# Patient Record
Sex: FEMALE | Employment: FULL TIME | URBAN - METROPOLITAN AREA
[De-identification: names, ages, dates, MRNs, and addresses within clinical notes are randomized per-mention and may not be internally consistent; named-entity substitution may affect disease eponyms.]

---

## 2020-01-08 LAB
HBV SURFACE AG SERPL QL IA: NEGATIVE
HIV 1+2 AB+HIV1 P24 AG SERPL QL IA: NEGATIVE

## 2020-07-29 DIAGNOSIS — Z33.1 PREGNANT STATE, INCIDENTAL: Primary | ICD-10-CM

## 2020-08-05 DIAGNOSIS — Z33.1 PREGNANT STATE, INCIDENTAL: ICD-10-CM

## 2020-08-05 PROCEDURE — U0003 INFECTIOUS AGENT DETECTION BY NUCLEIC ACID (DNA OR RNA); SEVERE ACUTE RESPIRATORY SYNDROME CORONAVIRUS 2 (SARS-COV-2) (CORONAVIRUS DISEASE [COVID-19]), AMPLIFIED PROBE TECHNIQUE, MAKING USE OF HIGH THROUGHPUT TECHNOLOGIES AS DESCRIBED BY CMS-2020-01-R: HCPCS | Performed by: OBSTETRICS & GYNECOLOGY

## 2020-08-06 LAB
SARS-COV-2 RNA SPEC QL NAA+PROBE: NOT DETECTED
SPECIMEN SOURCE: NORMAL

## 2020-08-07 ENCOUNTER — NURSE TRIAGE (OUTPATIENT)
Dept: NURSING | Facility: CLINIC | Age: 45
End: 2020-08-07

## 2020-08-07 ENCOUNTER — HOSPITAL ENCOUNTER (INPATIENT)
Facility: CLINIC | Age: 45
LOS: 2 days | Discharge: HOME | End: 2020-08-09
Attending: OBSTETRICS & GYNECOLOGY | Admitting: OBSTETRICS & GYNECOLOGY
Payer: COMMERCIAL

## 2020-08-07 LAB
ABO + RH BLD: NORMAL
ABO + RH BLD: NORMAL
BLD GP AB SCN SERPL QL: NORMAL
BLOOD BANK CMNT PATIENT-IMP: NORMAL
GLUCOSE BLDC GLUCOMTR-MCNC: 137 MG/DL (ref 70–99)
GLUCOSE BLDC GLUCOMTR-MCNC: 138 MG/DL (ref 70–99)
GLUCOSE BLDC GLUCOMTR-MCNC: 139 MG/DL (ref 70–99)
GLUCOSE BLDC GLUCOMTR-MCNC: 143 MG/DL (ref 70–99)
GLUCOSE BLDC GLUCOMTR-MCNC: 88 MG/DL (ref 70–99)
GLUCOSE BLDC GLUCOMTR-MCNC: 96 MG/DL (ref 70–99)
HGB BLD-MCNC: 12.5 G/DL (ref 11.7–15.7)
SPECIMEN EXP DATE BLD: NORMAL
T PALLIDUM AB SER QL: NONREACTIVE

## 2020-08-07 PROCEDURE — 85018 HEMOGLOBIN: CPT | Performed by: OBSTETRICS & GYNECOLOGY

## 2020-08-07 PROCEDURE — 86900 BLOOD TYPING SEROLOGIC ABO: CPT | Performed by: OBSTETRICS & GYNECOLOGY

## 2020-08-07 PROCEDURE — 86780 TREPONEMA PALLIDUM: CPT | Performed by: OBSTETRICS & GYNECOLOGY

## 2020-08-07 PROCEDURE — 10907ZC DRAINAGE OF AMNIOTIC FLUID, THERAPEUTIC FROM PRODUCTS OF CONCEPTION, VIA NATURAL OR ARTIFICIAL OPENING: ICD-10-PCS | Performed by: OBSTETRICS & GYNECOLOGY

## 2020-08-07 PROCEDURE — 86850 RBC ANTIBODY SCREEN: CPT | Performed by: OBSTETRICS & GYNECOLOGY

## 2020-08-07 PROCEDURE — 00000146 ZZHCL STATISTIC GLUCOSE BY METER IP

## 2020-08-07 PROCEDURE — 25000125 ZZHC RX 250: Performed by: OBSTETRICS & GYNECOLOGY

## 2020-08-07 PROCEDURE — 72200001 ZZH LABOR CARE VAGINAL DELIVERY SINGLE

## 2020-08-07 PROCEDURE — 0KQM0ZZ REPAIR PERINEUM MUSCLE, OPEN APPROACH: ICD-10-PCS | Performed by: OBSTETRICS & GYNECOLOGY

## 2020-08-07 PROCEDURE — 25000132 ZZH RX MED GY IP 250 OP 250 PS 637: Performed by: OBSTETRICS & GYNECOLOGY

## 2020-08-07 PROCEDURE — G0463 HOSPITAL OUTPT CLINIC VISIT: HCPCS

## 2020-08-07 PROCEDURE — 25800030 ZZH RX IP 258 OP 636: Performed by: OBSTETRICS & GYNECOLOGY

## 2020-08-07 PROCEDURE — 86901 BLOOD TYPING SEROLOGIC RH(D): CPT | Performed by: OBSTETRICS & GYNECOLOGY

## 2020-08-07 PROCEDURE — 12000000 ZZH R&B MED SURG/OB

## 2020-08-07 RX ORDER — ACETAMINOPHEN 325 MG/1
650 TABLET ORAL EVERY 4 HOURS PRN
Status: DISCONTINUED | OUTPATIENT
Start: 2020-08-07 | End: 2020-08-09 | Stop reason: HOSPADM

## 2020-08-07 RX ORDER — OXYTOCIN 10 [USP'U]/ML
10 INJECTION, SOLUTION INTRAMUSCULAR; INTRAVENOUS
Status: DISCONTINUED | OUTPATIENT
Start: 2020-08-07 | End: 2020-08-07

## 2020-08-07 RX ORDER — IBUPROFEN 800 MG/1
800 TABLET, FILM COATED ORAL
Status: DISCONTINUED | OUTPATIENT
Start: 2020-08-07 | End: 2020-08-07

## 2020-08-07 RX ORDER — ONDANSETRON 2 MG/ML
4 INJECTION INTRAMUSCULAR; INTRAVENOUS EVERY 6 HOURS PRN
Status: DISCONTINUED | OUTPATIENT
Start: 2020-08-07 | End: 2020-08-07

## 2020-08-07 RX ORDER — AMOXICILLIN 250 MG
1 CAPSULE ORAL 2 TIMES DAILY
Status: DISCONTINUED | OUTPATIENT
Start: 2020-08-07 | End: 2020-08-09 | Stop reason: HOSPADM

## 2020-08-07 RX ORDER — DEXTROSE MONOHYDRATE 25 G/50ML
25-50 INJECTION, SOLUTION INTRAVENOUS
Status: DISCONTINUED | OUTPATIENT
Start: 2020-08-07 | End: 2020-08-07

## 2020-08-07 RX ORDER — HYDROCORTISONE 2.5 %
CREAM (GRAM) TOPICAL 3 TIMES DAILY PRN
Status: DISCONTINUED | OUTPATIENT
Start: 2020-08-07 | End: 2020-08-09 | Stop reason: HOSPADM

## 2020-08-07 RX ORDER — OXYCODONE AND ACETAMINOPHEN 5; 325 MG/1; MG/1
1 TABLET ORAL
Status: DISCONTINUED | OUTPATIENT
Start: 2020-08-07 | End: 2020-08-07

## 2020-08-07 RX ORDER — MODIFIED LANOLIN
OINTMENT (GRAM) TOPICAL
Status: DISCONTINUED | OUTPATIENT
Start: 2020-08-07 | End: 2020-08-09 | Stop reason: HOSPADM

## 2020-08-07 RX ORDER — FENTANYL CITRATE 50 UG/ML
50-100 INJECTION, SOLUTION INTRAMUSCULAR; INTRAVENOUS
Status: DISCONTINUED | OUTPATIENT
Start: 2020-08-07 | End: 2020-08-07

## 2020-08-07 RX ORDER — METHYLERGONOVINE MALEATE 0.2 MG/ML
200 INJECTION INTRAVENOUS
Status: DISCONTINUED | OUTPATIENT
Start: 2020-08-07 | End: 2020-08-07

## 2020-08-07 RX ORDER — OXYTOCIN/0.9 % SODIUM CHLORIDE 30/500 ML
340 PLASTIC BAG, INJECTION (ML) INTRAVENOUS CONTINUOUS PRN
Status: DISCONTINUED | OUTPATIENT
Start: 2020-08-07 | End: 2020-08-09 | Stop reason: HOSPADM

## 2020-08-07 RX ORDER — CARBOPROST TROMETHAMINE 250 UG/ML
250 INJECTION, SOLUTION INTRAMUSCULAR
Status: DISCONTINUED | OUTPATIENT
Start: 2020-08-07 | End: 2020-08-07

## 2020-08-07 RX ORDER — OXYTOCIN 10 [USP'U]/ML
10 INJECTION, SOLUTION INTRAMUSCULAR; INTRAVENOUS
Status: DISCONTINUED | OUTPATIENT
Start: 2020-08-07 | End: 2020-08-09 | Stop reason: HOSPADM

## 2020-08-07 RX ORDER — NICOTINE POLACRILEX 4 MG
15-30 LOZENGE BUCCAL
Status: DISCONTINUED | OUTPATIENT
Start: 2020-08-07 | End: 2020-08-07

## 2020-08-07 RX ORDER — NALOXONE HYDROCHLORIDE 0.4 MG/ML
.1-.4 INJECTION, SOLUTION INTRAMUSCULAR; INTRAVENOUS; SUBCUTANEOUS
Status: DISCONTINUED | OUTPATIENT
Start: 2020-08-07 | End: 2020-08-07

## 2020-08-07 RX ORDER — IBUPROFEN 800 MG/1
800 TABLET, FILM COATED ORAL EVERY 6 HOURS PRN
Status: DISCONTINUED | OUTPATIENT
Start: 2020-08-07 | End: 2020-08-09 | Stop reason: HOSPADM

## 2020-08-07 RX ORDER — BISACODYL 10 MG
10 SUPPOSITORY, RECTAL RECTAL DAILY PRN
Status: DISCONTINUED | OUTPATIENT
Start: 2020-08-09 | End: 2020-08-09 | Stop reason: HOSPADM

## 2020-08-07 RX ORDER — MISOPROSTOL 200 UG/1
400 TABLET ORAL
Status: DISCONTINUED | OUTPATIENT
Start: 2020-08-07 | End: 2020-08-09 | Stop reason: HOSPADM

## 2020-08-07 RX ORDER — CARBOPROST TROMETHAMINE 250 UG/ML
250 INJECTION, SOLUTION INTRAMUSCULAR
Status: DISCONTINUED | OUTPATIENT
Start: 2020-08-07 | End: 2020-08-09 | Stop reason: HOSPADM

## 2020-08-07 RX ORDER — TRANEXAMIC ACID 10 MG/ML
1 INJECTION, SOLUTION INTRAVENOUS EVERY 30 MIN PRN
Status: DISCONTINUED | OUTPATIENT
Start: 2020-08-07 | End: 2020-08-07

## 2020-08-07 RX ORDER — METHYLERGONOVINE MALEATE 0.2 MG/ML
200 INJECTION INTRAVENOUS
Status: DISCONTINUED | OUTPATIENT
Start: 2020-08-07 | End: 2020-08-09 | Stop reason: HOSPADM

## 2020-08-07 RX ORDER — DEXTROSE MONOHYDRATE 25 G/50ML
25-50 INJECTION, SOLUTION INTRAVENOUS
Status: DISCONTINUED | OUTPATIENT
Start: 2020-08-07 | End: 2020-08-09 | Stop reason: HOSPADM

## 2020-08-07 RX ORDER — TRANEXAMIC ACID 10 MG/ML
1 INJECTION, SOLUTION INTRAVENOUS EVERY 30 MIN PRN
Status: DISCONTINUED | OUTPATIENT
Start: 2020-08-07 | End: 2020-08-09 | Stop reason: HOSPADM

## 2020-08-07 RX ORDER — OXYTOCIN/0.9 % SODIUM CHLORIDE 30/500 ML
100 PLASTIC BAG, INJECTION (ML) INTRAVENOUS CONTINUOUS
Status: DISCONTINUED | OUTPATIENT
Start: 2020-08-07 | End: 2020-08-09 | Stop reason: HOSPADM

## 2020-08-07 RX ORDER — AMOXICILLIN 250 MG
2 CAPSULE ORAL 2 TIMES DAILY
Status: DISCONTINUED | OUTPATIENT
Start: 2020-08-07 | End: 2020-08-09 | Stop reason: HOSPADM

## 2020-08-07 RX ORDER — ACETAMINOPHEN 325 MG/1
650 TABLET ORAL EVERY 4 HOURS PRN
Status: DISCONTINUED | OUTPATIENT
Start: 2020-08-07 | End: 2020-08-07

## 2020-08-07 RX ORDER — SODIUM CHLORIDE 9 MG/ML
INJECTION, SOLUTION INTRAVENOUS CONTINUOUS
Status: DISCONTINUED | OUTPATIENT
Start: 2020-08-07 | End: 2020-08-07

## 2020-08-07 RX ORDER — NALOXONE HYDROCHLORIDE 0.4 MG/ML
.1-.4 INJECTION, SOLUTION INTRAMUSCULAR; INTRAVENOUS; SUBCUTANEOUS
Status: DISCONTINUED | OUTPATIENT
Start: 2020-08-07 | End: 2020-08-09 | Stop reason: HOSPADM

## 2020-08-07 RX ORDER — NICOTINE POLACRILEX 4 MG
15-30 LOZENGE BUCCAL
Status: DISCONTINUED | OUTPATIENT
Start: 2020-08-07 | End: 2020-08-09 | Stop reason: HOSPADM

## 2020-08-07 RX ORDER — SODIUM CHLORIDE, SODIUM LACTATE, POTASSIUM CHLORIDE, CALCIUM CHLORIDE 600; 310; 30; 20 MG/100ML; MG/100ML; MG/100ML; MG/100ML
INJECTION, SOLUTION INTRAVENOUS CONTINUOUS
Status: DISCONTINUED | OUTPATIENT
Start: 2020-08-07 | End: 2020-08-07

## 2020-08-07 RX ORDER — OXYTOCIN/0.9 % SODIUM CHLORIDE 30/500 ML
100-340 PLASTIC BAG, INJECTION (ML) INTRAVENOUS CONTINUOUS PRN
Status: COMPLETED | OUTPATIENT
Start: 2020-08-07 | End: 2020-08-07

## 2020-08-07 RX ADMIN — SODIUM CHLORIDE: 9 INJECTION, SOLUTION INTRAVENOUS at 05:00

## 2020-08-07 RX ADMIN — DOCUSATE SODIUM AND SENNOSIDES 1 TABLET: 8.6; 5 TABLET ORAL at 20:45

## 2020-08-07 RX ADMIN — IBUPROFEN 800 MG: 800 TABLET, FILM COATED ORAL at 07:39

## 2020-08-07 RX ADMIN — IBUPROFEN 800 MG: 800 TABLET, FILM COATED ORAL at 23:11

## 2020-08-07 RX ADMIN — DOCUSATE SODIUM AND SENNOSIDES 1 TABLET: 8.6; 5 TABLET ORAL at 12:16

## 2020-08-07 RX ADMIN — LIDOCAINE HYDROCHLORIDE 20 ML: 10 INJECTION, SOLUTION EPIDURAL; INFILTRATION; INTRACAUDAL; PERINEURAL at 06:45

## 2020-08-07 RX ADMIN — Medication 340 ML/HR: at 06:45

## 2020-08-07 RX ADMIN — Medication 1.5 UNITS/HR: at 04:39

## 2020-08-07 NOTE — PROVIDER NOTIFICATION
08/07/20 0619   Provider Notification   Provider Name/Title Dr. Siddiqui   Method of Notification Phone     MD updated on SVE with an anterior lip and patient's urge to push. MD to come to bedside.

## 2020-08-07 NOTE — CONSULTS
"BRIEF NUTRITION ASSESSMENT      REASON FOR ASSESSMENT:  Aixa Luke is a 45 year old female seen by Registered Dietitian for Admission Nutrition Risk Screen for new/uncontrolled diabetes.    NUTRITION HISTORY:  - Per review of chart patient with a h/o DMII, on insulin PTA.   - Now post-partum.     CURRENT DIET AND INTAKE:  Diet: Mod CHO    ANTHROPOMETRICS:  Height: 5' 7\"  Weight: 190# --> most recent wt from 7/29/2020  BMI: 29 kg/m^2  Weight Status: Unable to determine   Weight History:  Wt Readings from Last 10 Encounters:   No data found for Wt       LABS:  Labs noted:  No results found for: A1C    MALNUTRITION:  Patient does not meet 2 malnutrition criteria.      NUTRITION INTERVENTION:  Nutrition Diagnosis:  No nutrition diagnosis at this time.    Implementation:  Nutrition Education: No education needs assessed at this time, outpatient follow-up if needed per MD discretion.     FOLLOW UP/MONITORING:   Will re-evaluate in 7 - 10 days, or sooner, if formally consulted.        Roya Acevedo RDN, LD  Clinical Dietitian  3rd floor/ICU: [phone]  All other floors: [phone]  Weekend/holiday: [phone]  "

## 2020-08-07 NOTE — TELEPHONE ENCOUNTER
"ANGEL 20    Dr. Aaron from [facility]   Planning to deliver at [facility]   4th baby, Last one was 17 years ago and lasted 3 hours.     Thinks she is in labor  Contractions started yesterday around 4pm  At 10pm became about every 10 min     For the last half hour  About 5 min apart, intense contration  45-60sec  Getting more intense    [place] discharge yesterday    No leakage of fluid  No vaginal bleeding  No new hand/face swelling     Triaged to a disposition of Go to L&D Now. Patient is agreeable and states she will be there in 25 min. Notified L&D     Reason for Disposition    [1] History of prior delivery (multipara) AND [2] contractions < 10 minutes apart AND [3] present 1 hour    Additional Information    Negative: Passed out (i.e., lost consciousness, collapsed and was not responding)    Negative: Shock suspected (e.g., cold/pale/clammy skin, too weak to stand, low BP, rapid pulse)    Negative: Difficult to awaken or acting confused (e.g., disoriented, slurred speech)    Negative: [1] SEVERE abdominal pain (e.g., excruciating) AND [2] constant AND [3] present > 1 hour    Negative: Severe bleeding (e.g., continuous red blood from vagina, or large blood clots)    Negative: Umbilical cord hanging out of the vagina (shiny, white, curled appearance, \"like telephone cord\")    Negative: Uncontrollable urge to push (i.e., feels like baby is coming out now)    Negative: Can see baby    Negative: Sounds like a life-threatening emergency to the triager    Negative: Pregnant < 37 weeks (i.e., )    Negative: [1] Uncertain delivery date AND [2] possibly pregnant < 37 weeks (i.e., )    Negative: [1] First baby (primipara) AND [2] contractions < 6 minutes apart  AND [3] present 2 hours    Protocols used: PREGNANCY - LABOR-A-    Delaney Gonzalez RN on 2020 at 7:12 AM    "

## 2020-08-07 NOTE — L&D DELIVERY NOTE
Aixa Luke is a 45 year old  who was admitted at 39w1d in active labor.   Pregnancy c/b:   - AMA; Abnormal Quad; normal level 2 US.  Declined any further screening.  - T2DM on insulin   She underwent labor augmentation with AROM upon admission, notable for clear fluid.  She used NO for labor. She was noted to be complete at around 0625.  She pushed effectively and delivered a viable male infant at 0633 with APGARs of 8 and 9 respectively.  Spontaneously delivery of an intact placenta with a 3-V cord ensued shortly thereafter, trailing membranes were present.  She received 30 units of IV pitocin as a uterotonic agent.  Exam of the perineum revealed a 2nd degree laceration which was repaired in standard fashion using a 3-0 vicryl suture.  Bilateral periurethral lacerations were hemostatic, and thus not repaired. QBL 469cc.      Lillian Huntley MD   Pager: [phone]   2020, 6:57 AM     Delivery Summary    Aixa Luke MRN# [id number]   Age: 45 year old YOB: 1975          Labor Length    3rd Stage (hrs):  0 (min):  5      Labor Events     labor?:  No   steroids:  None  Labor Type:  Spontaneous  Predominate monitoring during 1st stage:  continuous electronic fetal monitoring     Antibiotics received during labor?:  No     Rupture date/time: 20 0438   Rupture type:  Artificial Rupture of Membranes  Fluid color:  Clear     1:1 continuous labor support provided by?:  RN       Delivery/Placenta Date and Time    Delivery Date:  20 Delivery Time:   6:33 AM   Placenta Date/Time:  2020  6:39 AM  Oxytocin given at the time of delivery:  after delivery of placenta     Vaginal Counts     Initial count performed by 2 team members:   Two Team Members   PHYLLIS Dominguez Dr.       Needles Suture Erick Sponges Instruments   Initial counts 2  5    Added to count  1     Final counts 2 1 5    Placed during labor Accounted for at the end of labor   No NA   No NA    No NA    Final count performed by 2 team members:   Two Team Members   George LYNN         Apgars    Living status:  Living   1 Minute 5 Minute 10 Minute 15 Minute 20 Minute   Skin color: 0  1       Heart rate: 2  2       Reflex irritability: 2  2       Muscle tone: 2  2       Respiratory effort: 2  2       Total: 8  9       Apgars assigned by:  ADORE LYNN RN.     Cord    Cord Blood Disposition:  Lab Gases Sent?:  No       Resuscitation    Methods:  None     Skin to Skin and Feeding Plan    Skin to skin initiation date/time: 1841    Skin to skin with:  Mother  Skin to skin end date/time:        Labor Events and Shoulder Dystocia    Fetal Tracing Prior to Delivery:  Category 2  Shoulder dystocia present?:  Neg     Delivery (Maternal) (Provider to Complete) (675770)    Episiotomy:  None  Perineal lacerations:  2nd Repaired?:  Yes   Periurethral laceration:  bilateral Repaired?:  No      Blood Loss  Mother: Randall LukeAixa #[id number]   Start of Mother's Information    IO Blood Loss  20 1833 - 20 0658    Delivery QBL (mL) Hospital Encounter 469 mL    Total  469 mL         End of Mother's Information  Mother: Randall LukeAixa #[id number]         Delivery - Provider to Complete ([id number])    Delivering clinician:  Lillian Vazquez MD  Attempted Delivery Types (Choose all that apply):  Spontaneous Vaginal Delivery  Delivery Type (Choose the 1 that will go to the Birth History):  Vaginal, Spontaneous   Other personnel:   Provider Role   Bardai, Rozina R, RN Kolles, Jessica L, RN Delivery Nurse   Lillian Vazquez MD Obstetrician         Placenta    Delayed Cord Clamping:  Done  Date/Time:  2020  6:39 AM  Removal:  Expressed  Disposition:  Hospital disposal     Anesthesia    Method:  Nitrous Oxide          Presentation and Position    Presentation:  Vertex  Position:  Left Occiput Anterior           Lillian Vazquez MD

## 2020-08-07 NOTE — PROGRESS NOTES
"Consult noted.  Patient listed as having \"diabetes mellitus\" in her history, however, review of records shows HgbA1c of 5.9 at her prenatal visit.  Was on no antihyperglycemics prior to pregnancy.  Looks as though Ob Gyn has added ISS.      Will defer consult for now.  Please call if needs to be seen.      Macey PAC  "

## 2020-08-07 NOTE — PROVIDER NOTIFICATION
08/07/20 0558   Provider Notification   Provider Name/Title Dr. Siddiqui   Method of Notification Phone   Notification Reason Status Update;SVE     Updated MD about SVE of 8/90/0 and patient still tolerating nitrous oxide. MD waiting for another patient to potentially deliver, and then would like pitocin started unless patient makes progress. Verbal orders for pitocin orderset.

## 2020-08-07 NOTE — PLAN OF CARE
Data: Aixa Luke transferred to [facility] via wheelchair at 0900. Baby transferred via parent's arms.  Action: Receiving unit notified of transfer: Yes. Patient and family notified of room change. Report given to PHYLLIS Morgan at 0905. Belongings sent to receiving unit. Accompanied by Registered Nurse. Oriented patient to surroundings. Call light within reach. ID bands double-checked with receiving RN.  Response: Patient tolerated transfer and is stable.

## 2020-08-07 NOTE — PLAN OF CARE
Data: Patient presented to [facility]: 2020  3:48 AM.  Reason for maternal/fetal assessment is uterine contractions. Patient reports feeling contractions begin at 4pm yesterday, which increased in frequency and intensity overnight.  Patient is a .  Prenatal record reviewed. Pregnancy  has been complicated by gestational diabetes, insulin controlled and AMA.  Gestational Age 39w1d. VSS. Fetal movement present. Patient denies leaking of vaginal fluid/rupture of membranes, vaginal bleeding, abdominal pain, pelvic pressure, nausea, vomiting, headache, visual disturbances, epigastric or URQ pain, significant edema. Support person is present.   Action: Verbal consent for EFM. Triage assessment completed. Bill of rights reviewed.  Response: Patient verbalized agreement with plan. Will contact Dr Lillian Vazquez with update and further orders.

## 2020-08-07 NOTE — PLAN OF CARE
Late entry: MD notified via charge nurse of patient's arrival, being a multip at 8cm and non-medicated. MD is on her way. Updated upon arrival of patient's history. MD did an SVE and AROM with clear fluid. Patient using nitrous oxide and not planning an epidural.

## 2020-08-07 NOTE — H&P
[facility] Labor and Delivery History and Physical    Aixa Luke MRN# [id number]   Age: 45 year old YOB: 1975     Date of Admission:  2020         HPI:   Aixa Luke is a 45 year old  at 39w1d by 1st trimester US admitted for contractions which started around 0400 this AM with increasing frequency and intensity.   She denies any vaginal bleeding, leakage of fluid or changes in her vaginal discharge.  She denies any regular, painful contractions.  Good fetal movement.     Pregnancy c/b:   - AMA; Abnormal Quad; normal level 2 US.  Declined any further screening.  - T2DM on insulin           Pregnancy history:     OBSTETRIC HISTORY:  OB History    Para Term  AB Living   4 3 3 0 0 3   SAB TAB Ectopic Multiple Live Births   0 0 0 0 3      # Outcome Date GA Lbr Kb/2nd Weight Sex Delivery Anes PTL Lv   4 Current            3 Term            2 Term            1 Term                EDC: Estimated Date of Delivery: Aug 13, 2020    Prenatal Labs:   Lab Results   Component Value Date    HGB 12.5 2020     GBS negative on 2020        Maternal Past Medical History:     Past Medical History:   Diagnosis Date     Type 2 diabetes mellitus (H)      Past Surgical History:   Procedure Laterality Date     NO HISTORY OF SURGERY            Family History:   Denies any history of bleeding or clotting disorders, no adverse reactions to anesthesia.            Social History:     Social History     Tobacco Use     Smoking status: Never Smoker     Smokeless tobacco: Never Used   Substance Use Topics     Alcohol use: Not Currently            Review of Systems:   The Review of Systems is negative other than noted in the HPI          Physical Exam:     Patient Vitals for the past 8 hrs:   BP Resp   20 0430 123/77 18     Gen: Pleasant, NAD   CV:  Regular rate  Resp: Non-labored breathing.  Lungs clear to ausculation bilaterally   Abd: Obese, soft, non-tender and  non-distended   Ext: Trace pedal edema bilaterally     Cervix: 8/100%/-1  Membranes: AROM, clear at 0435  EFW: 7 lbs.  Presentation:Cephalic    Fetal Heart Rate Tracing:   Baseline 130  Variability: Moderate  Accelerations: Present  Decelerations: None  Interpretation: reactive    Contractions: q 3-5 min per EFM        Assessment:   Aixa Luke is a 45 year old  at 39w1d admitted in active labor.        Plan:     Labor:   - Onset of labor around 0400 this morning, SVE 8/100/-1 with BBOW, patient amenable to AROM which was done at 0435 notable for clear fluid  Pain: Desires NO which she is using     T2DM on at bedtime insulin:    - Growth US on  showed EFW 76%ile and AC 86%ile.  - PP management per protocol     FWB:   - Continous EFM   - Category I FHT     Other:   - Rh positive, RI, placenta anterior, EFW 7#   - GBS negative   - Vasectomy   - AMA; Abnormal Quad; normal level 2 US.  Declined any further screening.  - h/o  x3, pelvis proven to 7# 12 oz. Last delivery 17 years ago    Lillian Huntley MD   Pager: [phone]   2020, 4:40 AM

## 2020-08-08 LAB
GLUCOSE BLDC GLUCOMTR-MCNC: 74 MG/DL (ref 70–99)
HGB BLD-MCNC: 11.3 G/DL (ref 11.7–15.7)

## 2020-08-08 PROCEDURE — 40000084 ZZH STATISTIC IP LACTATION SERVICES 16-30 MIN

## 2020-08-08 PROCEDURE — 00000146 ZZHCL STATISTIC GLUCOSE BY METER IP

## 2020-08-08 PROCEDURE — 85018 HEMOGLOBIN: CPT | Performed by: OBSTETRICS & GYNECOLOGY

## 2020-08-08 PROCEDURE — 12000000 ZZH R&B MED SURG/OB

## 2020-08-08 PROCEDURE — 36415 COLL VENOUS BLD VENIPUNCTURE: CPT | Performed by: OBSTETRICS & GYNECOLOGY

## 2020-08-08 PROCEDURE — 25000132 ZZH RX MED GY IP 250 OP 250 PS 637: Performed by: OBSTETRICS & GYNECOLOGY

## 2020-08-08 RX ADMIN — IBUPROFEN 800 MG: 800 TABLET, FILM COATED ORAL at 18:28

## 2020-08-08 RX ADMIN — DOCUSATE SODIUM AND SENNOSIDES 2 TABLET: 8.6; 5 TABLET, FILM COATED ORAL at 20:35

## 2020-08-08 RX ADMIN — IBUPROFEN 800 MG: 800 TABLET, FILM COATED ORAL at 11:35

## 2020-08-08 RX ADMIN — DOCUSATE SODIUM AND SENNOSIDES 1 TABLET: 8.6; 5 TABLET ORAL at 11:29

## 2020-08-08 NOTE — PROGRESS NOTES
[facility]  PPD# 1    Pt doing well, states she prefers to go home tomorrow. Ambulating, voiding, tolerating PO. Decreased lochia. Pain controlled. Breast feeding and coping well with infant.    Vitals:    20 0845 20 1100 20 1800 20 0000   BP: 136/73 134/71 126/72 132/81   Resp:  16 16 16   Temp:  98.5  F (36.9  C) 98.4  F (36.9  C) 98.1  F (36.7  C)   TempSrc:  Oral Oral Oral     Abd soft, appropriately tender, nondistended, FFBU, no fundal tenderness  Ext 1+ edema bilat LE, no CT BL    Lab Results   Component Value Date    HGB 11.3 2020   Results for DALE MATSON (MRN [id number]) as of 2020 10:20   Ref. Range 2020 11:54 2020 18:33 2020 23:07 2020 06:41 2020 06:44   Glucose Latest Ref Range: 70 - 99 mg/dL 138 (H) 88 96 74        A/P 45 year old  at 39w1d s/p  PPD# 1. Pregnancy comnplicated by T2DM on insulin.     -Hemodynamically stable   - Rh pos  -Diet; regular  -pain Tylenol and Motrin  -Bowel ppx- Senna/docusate/simethicone  -Encouraged ambulation  -Rubella immune  -Tdap given  -Breast feeding, breast pump provided prenatally  -T2DM. No longer using insulin. BS are under control. Hospitalist signed off, concerns of no real T2DM.    - ok to discontinue BS checks  -Plan; continue routine post-partum care. Anticipate discharge home tomorrow      Dr. Alexandria Calvo  [phone]  2020 9:54 AM

## 2020-08-08 NOTE — PLAN OF CARE
Pt up ad sonal and caring for self independently. Breastfeeding going fair so far. Baby sleepy at breast and needs encouragement. Denies pain.  present and supportive.

## 2020-08-08 NOTE — PLAN OF CARE
Pt s/p ; VSS and assessment WNL.  Pt voiding without difficulty and passing flatus.  Breastfeeding infant independently well.  Pain well controlled.  Positive bonding observed.  Pt stable; will continue with current plan of care.

## 2020-08-08 NOTE — PLAN OF CARE
Independent with self and baby cares. Breastfeeding. Using Ibu as needed. Working on discharge paperwork.

## 2020-08-08 NOTE — PLAN OF CARE
Patient vital signs stable and meeting expected outcomes.  Breastfeeding well with no assistance.  Up independently and voiding adequately.  Pain well controlled with ibuprofen.  Able to perform all cares for self and infant.  Bonding well with baby.  Will continue to monitor.

## 2020-08-08 NOTE — LACTATION NOTE
Lactation visit.  in nursery having circumcision at time of visit. This is her 4th child, but has been over a decade since she has breast fed a . She is concerned that her milk supply is not enough for  at this time. Reviewed normal course of lactation. Education provided on supply and demand. Encouraged hand expression after feedings and skin to skin. She asked about pumping, but reassured her that there is currently no medical indication it is necessary. Encouraged her to feed on demand. Reviewed newborns second night, and normal  behaviors. She is aware she may call for assistance with feedings as needed.

## 2020-08-09 VITALS — SYSTOLIC BLOOD PRESSURE: 124 MMHG | RESPIRATION RATE: 16 BRPM | DIASTOLIC BLOOD PRESSURE: 68 MMHG | TEMPERATURE: 98 F

## 2020-08-09 PROCEDURE — 25000132 ZZH RX MED GY IP 250 OP 250 PS 637: Performed by: OBSTETRICS & GYNECOLOGY

## 2020-08-09 RX ORDER — ACETAMINOPHEN 325 MG/1
650 TABLET ORAL EVERY 4 HOURS PRN
COMMUNITY
Start: 2020-08-09

## 2020-08-09 RX ORDER — BISACODYL 10 MG
10 SUPPOSITORY, RECTAL RECTAL DAILY PRN
COMMUNITY
Start: 2020-08-09

## 2020-08-09 RX ORDER — MODIFIED LANOLIN
OINTMENT (GRAM) TOPICAL
COMMUNITY
Start: 2020-08-09

## 2020-08-09 RX ORDER — HYDROCORTISONE 2.5 %
CREAM (GRAM) TOPICAL 3 TIMES DAILY PRN
COMMUNITY
Start: 2020-08-09

## 2020-08-09 RX ORDER — AMOXICILLIN 250 MG
1 CAPSULE ORAL 2 TIMES DAILY
Qty: 60 TABLET | Refills: 0 | Status: SHIPPED | OUTPATIENT
Start: 2020-08-09

## 2020-08-09 RX ORDER — IBUPROFEN 800 MG/1
800 TABLET, FILM COATED ORAL EVERY 6 HOURS PRN
Qty: 60 TABLET | Refills: 0 | Status: SHIPPED | OUTPATIENT
Start: 2020-08-09

## 2020-08-09 RX ADMIN — DOCUSATE SODIUM AND SENNOSIDES 1 TABLET: 8.6; 5 TABLET ORAL at 10:16

## 2020-08-09 RX ADMIN — IBUPROFEN 800 MG: 800 TABLET, FILM COATED ORAL at 10:16

## 2020-08-09 NOTE — PLAN OF CARE
Independent with self and baby cares. Breastfeeding. Using Ibuprofen as needed. Has breast pump at home. Discharge meds of Ibu and Senna given. Plan to discharge later today when  arrives.     Discharge instructions reviewed and all questions answered. Home with baby at 1345.

## 2020-08-09 NOTE — DISCHARGE SUMMARY
[facility] Discharge Summary    Aixa Luke MRN# [id number]   Age: 45 year old YOB: 1975     Date of Admission:  2020  Date of Discharge::  2020  Admitting Physician:  Lillian Vazquez MD  Discharge Physician:  Nila Aaron MD          Admission Diagnoses:   - IUP at 39w1d  - Spontaneous labor  - AMA: Abnormal Quad; normal level 2 US.  Declined any further screening.  - DM2          Discharge Diagnosis:     -IUP at 39w1d, now delivered          Procedures:     Procedure(s): -            Medications Prior to Admission:     No medications prior to admission.             Discharge Medications:   There are no discharge medications for this patient.           Brief Admission History   Aixa Luke is a 45 year old  at 39w1d by 1st trimester US admitted for contractions which started around 0400 this AM with increasing frequency and intensity.   She denies any vaginal bleeding, leakage of fluid or changes in her vaginal discharge.  She denies any regular, painful contractions.  Good fetal movement.      Pregnancy c/b:   - AMA; Abnormal Quad; normal level 2 US.  Declined any further screening.  - T2DM on insulin         Assessment:   Aixa Luke is a 45 year old  at 39w1d admitted in active labor.         Plan:      Labor:   - Onset of labor around 0400 this morning, SVE 8/100/-1 with BBOW, patient amenable to AROM which was done at 0435 notable for clear fluid  Pain: Desires NO which she is using      T2DM on at bedtime insulin:    - Growth US on  showed EFW 76%ile and AC 86%ile.  - PP management per protocol      FWB:   - Continous EFM   - Category I FHT      Other:   - Rh positive, RI, placenta anterior, EFW 7#   - GBS negative   - Vasectomy   - AMA; Abnormal Quad; normal level 2 US.  Declined any further screening.  - h/o  x3, pelvis proven to 7# 12 oz. Last delivery 17 years ago           Brief Intrapartum Course:   Patient progressed to  fully and delivered.  Delivery note as below:    Aixa Luke is a 45 year old  who was admitted at 39w1d in active labor.   Pregnancy c/b:   - AMA; Abnormal Quad; normal level 2 US.  Declined any further screening.  - T2DM on insulin   She underwent labor augmentation with AROM upon admission, notable for clear fluid.  She used NO for labor. She was noted to be complete at around 0625.  She pushed effectively and delivered a viable male infant at 0633 with APGARs of 8 and 9 respectively.  Spontaneously delivery of an intact placenta with a 3-V cord ensued shortly thereafter, trailing membranes were present.  She received 30 units of IV pitocin as a uterotonic agent.  Exam of the perineum revealed a 2nd degree laceration which was repaired in standard fashion using a 3-0 vicryl suture.  Bilateral periurethral lacerations were hemostatic, and thus not repaired. QBL 469cc.             Hospital Course:   The patient's hospital course was unremarkable.  On discharge, her pain was well controlled. Vaginal bleeding is similar to peak menstrual flow.  Voiding without difficulty.  Ambulating well and tolerating a normal diet.  No fever.  Breastfeeding well.  Infant is stable. She was discharged on post-partum day #2.    Post-partum hemoglobin:   Hemoglobin   Date Value Ref Range Status   2020 11.3 (L) 11.7 - 15.7 g/dL Final             Discharge Instructions and Follow-Up:     Discharge diet: Regular   Discharge activity: Pelvic rest for 6 weeks including no sexual intercourse, tampons, or douching.   Discharge follow-up: Follow up with your primary OB for a routine postpartum visit in 6 weeks           Discharge Disposition:     Discharged to home      Meredith Chan, MD [facility]  2020 8:49 AM

## 2020-08-09 NOTE — PROGRESS NOTES
Patient Name:  Axia Luke   MRN:  [id number]  Age:  45 year old    YOB: 1975      POSTPARTUM PROGRESS NOTE    Pt is PPD#2 s/p vaginal delivery.  She is doing well without complaints.  Pt is ambulating, voiding, tolerating a regular diet.  Pain is well controlled and lochia is within normal limits.  She is breastfeeding.  Baby is doing well.    Objective:    Temp:  [97.9  F (36.6  C)-98.3  F (36.8  C)] 97.9  F (36.6  C)  Heart Rate:  [84-97] 97  Resp:  [18] 18  BP: (124-136)/(79) 136/79  0 lbs 0 oz    General Appearance:  NAD  Lungs:  unlabored  Cardiovascular:  RRR  Abdomen:  nontender, nondistended  Fundus:  firm, below the umbilicus      Lower extremities:  trace symmetric edema    Lab Review:    Hemoglobin   Date Value Ref Range Status   2020 11.3 (L) 11.7 - 15.7 g/dL Final   2020 12.5 11.7 - 15.7 g/dL Final       No results found for: WBC  No results found for: RBC  Lab Results   Component Value Date    HGB 11.3 2020         Assessment:  44yo  PPD#2 s/p vaginal delivery, doing well.    Plan:   - Postpartum: recovering well. Pain well controlled. Cont PO pain meds and regular diet. Encourage ambulation.  - DM2: Appreciate hospitalist recommendations and care. No need for insulin or continued FSG monitoring.  Will f/u with PCP postpartum.  - Contraception: vasectomy  - Dispo: anticipate DC today      Meredith Chan, MD [facility]  2020

## 2020-08-09 NOTE — PLAN OF CARE
Patient assessment and VS WNL. Pain managed with ibuprofen. Patient up adlib and voiding without difficulty. Breastfeeding going well. Bonding well with infant and independent with cares. Anticipated discharge today.

## 2020-08-09 NOTE — DISCHARGE INSTRUCTIONS
[facility] Lactation Services: [phone]    Postpartum Vaginal Delivery Instructions    Activity       Ask family and friends for help when you need it.    Do not place anything in your vagina for 6 weeks.    You are not restricted on other activities, but take it easy for a few weeks to allow your body to recover from delivery.  You are able to do any activities you feel up to that point.    No driving until you have stopped taking your pain medications (usually two weeks after delivery).     Call your health care provider if you have any of these symptoms:       Increased pain, swelling, redness, or fluid around your stiches from an episiotomy or perineal tear.    A fever above 100.4 F (38 C) with or without chills when placing a thermometer under your tongue.    You soak a sanitary pad with blood within 1 hour, or you see blood clots larger than a golf ball.    Bleeding that lasts more than 6 weeks.    Vaginal discharge that smells bad.    Severe pain, cramping or tenderness in your lower belly area.    A need to urinate more frequently (use the toilet more often), more urgently (use the toilet very quickly), or it burns when you urinate.    Nausea and vomiting.    Redness, swelling or pain around a vein in your leg.    Problems breastfeeding or a red or painful area on your breast.    Chest pain and cough or are gasping for air.    Problems coping with sadness, anxiety, or depression.  If you have any concerns about hurting yourself or the baby, call your provider immediately.     You have questions or concerns after you return home.     Keep your hands clean:  Always wash your hands before touching your perineal area and stitches.  This helps reduce your risk of infection.  If your hands aren't dirty, you may use an alcohol hand-rub to clean your hands. Keep your nails clean and short.

## 2020-11-16 ENCOUNTER — HEALTH MAINTENANCE LETTER (OUTPATIENT)
Age: 45
End: 2020-11-16

## 2021-02-07 ENCOUNTER — HEALTH MAINTENANCE LETTER (OUTPATIENT)
Age: 46
End: 2021-02-07

## 2021-09-18 ENCOUNTER — HEALTH MAINTENANCE LETTER (OUTPATIENT)
Age: 46
End: 2021-09-18

## 2022-01-08 ENCOUNTER — HEALTH MAINTENANCE LETTER (OUTPATIENT)
Age: 47
End: 2022-01-08

## 2022-03-05 ENCOUNTER — HEALTH MAINTENANCE LETTER (OUTPATIENT)
Age: 47
End: 2022-03-05

## 2022-11-20 ENCOUNTER — HEALTH MAINTENANCE LETTER (OUTPATIENT)
Age: 47
End: 2022-11-20

## 2023-04-15 ENCOUNTER — HEALTH MAINTENANCE LETTER (OUTPATIENT)
Age: 48
End: 2023-04-15